# Patient Record
Sex: FEMALE | Race: BLACK OR AFRICAN AMERICAN | NOT HISPANIC OR LATINO | URBAN - METROPOLITAN AREA
[De-identification: names, ages, dates, MRNs, and addresses within clinical notes are randomized per-mention and may not be internally consistent; named-entity substitution may affect disease eponyms.]

---

## 2017-08-04 PROBLEM — Z00.00 ENCOUNTER FOR PREVENTIVE HEALTH EXAMINATION: Status: ACTIVE | Noted: 2017-08-04

## 2017-10-13 VITALS
WEIGHT: 240.08 LBS | RESPIRATION RATE: 16 BRPM | HEART RATE: 76 BPM | DIASTOLIC BLOOD PRESSURE: 80 MMHG | SYSTOLIC BLOOD PRESSURE: 134 MMHG | OXYGEN SATURATION: 97 % | HEIGHT: 67 IN

## 2017-10-13 RX ORDER — ESOMEPRAZOLE MAGNESIUM 40 MG/1
1 CAPSULE, DELAYED RELEASE ORAL
Qty: 0 | Refills: 0 | COMMUNITY

## 2017-10-13 RX ORDER — GABAPENTIN 400 MG/1
0 CAPSULE ORAL
Qty: 0 | Refills: 0 | COMMUNITY

## 2017-10-13 NOTE — H&P ADULT - NSHPLABSRESULTS_GEN_ALL_CORE
ISTAT labs   Hgb 12.9, K+ 3.8, BUN/Cr 9/0.8, PT 13.5, INR 1.1    Dr. Christiano webster to proceed with ISTAT labs ISTAT labs   Hgb 12.9, K+ 3.8, BUN/Cr 9/0.8, PT 13.5, INR 1.1    Dr. Christiano webster to proceed with ISTAT labs    EKG revealed NSR @ 76bpm with incomplete RBBB

## 2017-10-13 NOTE — H&P ADULT - GASTROINTESTINAL DETAILS
no distention/nontender/obese/no rigidity/bowel sounds normal/no guarding/soft/no rebound tenderness

## 2017-10-13 NOTE — H&P ADULT - HISTORY OF PRESENT ILLNESS
LMP: 5yrs ago    52 y.o Female  VERY POOR HISTORIAN with PMHx of GERD, PARISH, insomnia who presented to her cardiologist c/o new onset  CP and SOB  for the past few weeks.   Pt states she has been experiencing intermittent episodes of non radiating sub sternal CP with accompanying dyspnea occurring at rest and worsening upon exertion.  She states she can no longer walk more than one city block before the developing these symptoms and finding the need to stop and rest.  Denies  palpitations, dizziness, syncope, recent PND/orthopnea, LE edema.  Pt states she has had no diagnostic cardiac studies performed.      In light of pt's risk factors and above CCS Anginal Class 4  symptoms,  pt is now referred to [facility] for recommended Cardiac Cath with possible intervention if clinically indicated to  r/o suspected underlying ischemia. LMP: 5yrs ago    52 y.o Female  VERY POOR HISTORIAN with PMHx of GERD, PARISH, insomnia who presented to her cardiologist c/o new onset  CP and SOB  for the past few weeks.   Pt states she has been experiencing intermittent episodes of non radiating sub sternal CP with accompanying dyspnea occurring at rest and worsening upon exertion.  She states she can no longer walk more than 1/2 city block before the developing these symptoms and finding the need to stop and rest  mid  way.   Denies  palpitations, dizziness, syncope, recent PND/orthopnea, LE edema.  Pt states she has had no diagnostic cardiac studies performed.      In light of pt's risk factors and above CCS Anginal Class 4  symptoms,  pt is now referred to [facility] for recommended Cardiac Cath with possible intervention if clinically indicated to  r/o suspected underlying ischemia. LMP: 5yrs ago    52 y.o Female  VERY POOR HISTORIAN with PMHx of GERD, PARISH, insomnia who presented to her cardiologist c/o new onset  CP and SOB  for the past few weeks.   Pt states she has been experiencing intermittent episodes of non radiating sub sternal CP with accompanying dyspnea occurring at rest and worsening upon exertion.  She states she can no longer walk more than 1/2 city block before the developing these symptoms and finding the need to stop and rest  mid  way.   Denies  palpitations, dizziness, syncope, recent PND/orthopnea, LE edema.  Pt states she has had no diagnostic cardiac studies performed. In light of pt's risk factors and above CCS Anginal Class 4  symptoms,  pt is now referred to [facility] for recommended Cardiac Cath with possible intervention if clinically indicated to  r/o suspected underlying ischemia.

## 2017-10-13 NOTE — H&P ADULT - ASSESSMENT
52 y.o obese Female  VERY POOR HISTORIAN with PMHx of GERD, PARISH, insomnia who presents for recommended cardiac catheterization with possible intervention in the setting of class IV anginal symptoms and risk factors.    ASA 2, Mallampati 2    OF NOTE: Patient not on ASA and plavix. Patient to received ASA 325mg and plavix 600mg prior to procedure.    Risks & benefits of procedure and alternative therapy have been explained to the patient including but not limited to: allergic reaction, bleeding w/possible need for blood transfusion, infection, renal and vascular compromise, limb damage, arrhythmia, stroke, vessel dissection/perforation, Myocardial infarction, emergent CABG. Informed consent obtained and in chart. 52 y.o obese Female  VERY POOR HISTORIAN with PMHx of GERD, PARISH, insomnia who presents for recommended cardiac catheterization with possible intervention in the setting of class IV anginal symptoms and risk factors.    ASA 2, Mallampati 2    OF NOTE: Patient not on ASA and plavix. Patient to received ASA 325mg and plavix 600mg prior to procedure. IVF NS @ 75cc/hr pre-cath.    Risks & benefits of procedure and alternative therapy have been explained to the patient including but not limited to: allergic reaction, bleeding w/possible need for blood transfusion, infection, renal and vascular compromise, limb damage, arrhythmia, stroke, vessel dissection/perforation, Myocardial infarction, emergent CABG. Informed consent obtained and in chart.

## 2017-10-16 ENCOUNTER — OUTPATIENT (OUTPATIENT)
Dept: OUTPATIENT SERVICES | Facility: HOSPITAL | Age: 52
LOS: 1 days | Discharge: NURSING, REHAB OR LONG-TERM CARE | End: 2017-10-16
Payer: MEDICARE

## 2017-10-16 DIAGNOSIS — Z98.890 OTHER SPECIFIED POSTPROCEDURAL STATES: Chronic | ICD-10-CM

## 2017-10-16 DIAGNOSIS — Z98.51 TUBAL LIGATION STATUS: Chronic | ICD-10-CM

## 2017-10-16 DIAGNOSIS — Z90.49 ACQUIRED ABSENCE OF OTHER SPECIFIED PARTS OF DIGESTIVE TRACT: Chronic | ICD-10-CM

## 2017-10-16 LAB
ALBUMIN SERPL ELPH-MCNC: 4 G/DL — SIGNIFICANT CHANGE UP (ref 3.3–5)
ALP SERPL-CCNC: 52 U/L — SIGNIFICANT CHANGE UP (ref 40–120)
ALT FLD-CCNC: 21 U/L — SIGNIFICANT CHANGE UP (ref 10–45)
ANION GAP SERPL CALC-SCNC: 13 MMOL/L — SIGNIFICANT CHANGE UP (ref 5–17)
APTT BLD: 28.8 SEC — SIGNIFICANT CHANGE UP (ref 27.5–37.4)
AST SERPL-CCNC: 24 U/L — SIGNIFICANT CHANGE UP (ref 10–40)
BASOPHILS NFR BLD AUTO: 0.1 % — SIGNIFICANT CHANGE UP (ref 0–2)
BILIRUB SERPL-MCNC: 0.6 MG/DL — SIGNIFICANT CHANGE UP (ref 0.2–1.2)
BUN SERPL-MCNC: 9 MG/DL — SIGNIFICANT CHANGE UP (ref 7–23)
CALCIUM SERPL-MCNC: 9.5 MG/DL — SIGNIFICANT CHANGE UP (ref 8.4–10.5)
CHLORIDE SERPL-SCNC: 100 MMOL/L — SIGNIFICANT CHANGE UP (ref 96–108)
CHOLEST SERPL-MCNC: 191 MG/DL — SIGNIFICANT CHANGE UP (ref 10–199)
CK MB CFR SERPL CALC: 2 NG/ML — SIGNIFICANT CHANGE UP (ref 0–6.7)
CO2 SERPL-SCNC: 29 MMOL/L — SIGNIFICANT CHANGE UP (ref 22–31)
CREAT SERPL-MCNC: 0.74 MG/DL — SIGNIFICANT CHANGE UP (ref 0.5–1.3)
CRP SERPL-MCNC: 1.7 MG/DL — HIGH (ref 0–0.4)
EOSINOPHIL NFR BLD AUTO: 1.3 % — SIGNIFICANT CHANGE UP (ref 0–6)
GLUCOSE SERPL-MCNC: 90 MG/DL — SIGNIFICANT CHANGE UP (ref 70–99)
HBA1C BLD-MCNC: 5.2 % — SIGNIFICANT CHANGE UP (ref 4–5.6)
HCG SERPL-ACNC: 1.7 MIU/ML — SIGNIFICANT CHANGE UP
HCT VFR BLD CALC: 37 % — SIGNIFICANT CHANGE UP (ref 34.5–45)
HDLC SERPL-MCNC: 63 MG/DL — SIGNIFICANT CHANGE UP (ref 40–125)
HGB BLD-MCNC: 11.5 G/DL — SIGNIFICANT CHANGE UP (ref 11.5–15.5)
INR BLD: 1.03 — SIGNIFICANT CHANGE UP (ref 0.88–1.16)
LIPID PNL WITH DIRECT LDL SERPL: 114 MG/DL — SIGNIFICANT CHANGE UP
LYMPHOCYTES # BLD AUTO: 48.4 % — HIGH (ref 13–44)
MCHC RBC-ENTMCNC: 27.8 PG — SIGNIFICANT CHANGE UP (ref 27–34)
MCHC RBC-ENTMCNC: 31.1 G/DL — LOW (ref 32–36)
MCV RBC AUTO: 89.6 FL — SIGNIFICANT CHANGE UP (ref 80–100)
MONOCYTES NFR BLD AUTO: 5.9 % — SIGNIFICANT CHANGE UP (ref 2–14)
NEUTROPHILS NFR BLD AUTO: 44.3 % — SIGNIFICANT CHANGE UP (ref 43–77)
PLATELET # BLD AUTO: 270 K/UL — SIGNIFICANT CHANGE UP (ref 150–400)
POTASSIUM SERPL-MCNC: 3.9 MMOL/L — SIGNIFICANT CHANGE UP (ref 3.5–5.3)
POTASSIUM SERPL-SCNC: 3.9 MMOL/L — SIGNIFICANT CHANGE UP (ref 3.5–5.3)
PROT SERPL-MCNC: 7.6 G/DL — SIGNIFICANT CHANGE UP (ref 6–8.3)
PROTHROM AB SERPL-ACNC: 11.4 SEC — SIGNIFICANT CHANGE UP (ref 9.8–12.7)
RBC # BLD: 4.13 M/UL — SIGNIFICANT CHANGE UP (ref 3.8–5.2)
RBC # FLD: 13.9 % — SIGNIFICANT CHANGE UP (ref 10.3–16.9)
SODIUM SERPL-SCNC: 142 MMOL/L — SIGNIFICANT CHANGE UP (ref 135–145)
TOTAL CHOLESTEROL/HDL RATIO MEASUREMENT: 3 RATIO — LOW (ref 3.3–7.1)
TRIGL SERPL-MCNC: 69 MG/DL — SIGNIFICANT CHANGE UP (ref 10–149)
WBC # BLD: 7.2 K/UL — SIGNIFICANT CHANGE UP (ref 3.8–10.5)
WBC # FLD AUTO: 7.2 K/UL — SIGNIFICANT CHANGE UP (ref 3.8–10.5)

## 2017-10-16 PROCEDURE — 85025 COMPLETE CBC W/AUTO DIFF WBC: CPT

## 2017-10-16 PROCEDURE — 84702 CHORIONIC GONADOTROPIN TEST: CPT

## 2017-10-16 PROCEDURE — 93010 ELECTROCARDIOGRAM REPORT: CPT

## 2017-10-16 PROCEDURE — 80053 COMPREHEN METABOLIC PANEL: CPT

## 2017-10-16 PROCEDURE — C1887: CPT

## 2017-10-16 PROCEDURE — 85730 THROMBOPLASTIN TIME PARTIAL: CPT

## 2017-10-16 PROCEDURE — 82553 CREATINE MB FRACTION: CPT

## 2017-10-16 PROCEDURE — C1769: CPT

## 2017-10-16 PROCEDURE — 93458 L HRT ARTERY/VENTRICLE ANGIO: CPT | Mod: 26

## 2017-10-16 PROCEDURE — 85610 PROTHROMBIN TIME: CPT

## 2017-10-16 PROCEDURE — 93458 L HRT ARTERY/VENTRICLE ANGIO: CPT

## 2017-10-16 PROCEDURE — 93005 ELECTROCARDIOGRAM TRACING: CPT

## 2017-10-16 PROCEDURE — 86140 C-REACTIVE PROTEIN: CPT

## 2017-10-16 PROCEDURE — 83036 HEMOGLOBIN GLYCOSYLATED A1C: CPT

## 2017-10-16 PROCEDURE — 80061 LIPID PANEL: CPT

## 2017-10-16 PROCEDURE — 82550 ASSAY OF CK (CPK): CPT

## 2017-10-16 RX ORDER — SODIUM CHLORIDE 9 MG/ML
1000 INJECTION INTRAMUSCULAR; INTRAVENOUS; SUBCUTANEOUS
Qty: 0 | Refills: 0 | Status: DISCONTINUED | OUTPATIENT
Start: 2017-10-16 | End: 2017-10-16

## 2017-10-16 RX ORDER — SODIUM CHLORIDE 9 MG/ML
500 INJECTION INTRAMUSCULAR; INTRAVENOUS; SUBCUTANEOUS
Qty: 0 | Refills: 0 | Status: DISCONTINUED | OUTPATIENT
Start: 2017-10-16 | End: 2017-10-16

## 2017-10-16 RX ORDER — ASPIRIN/CALCIUM CARB/MAGNESIUM 324 MG
325 TABLET ORAL ONCE
Qty: 0 | Refills: 0 | Status: COMPLETED | OUTPATIENT
Start: 2017-10-16 | End: 2017-10-16

## 2017-10-16 RX ORDER — CLOPIDOGREL BISULFATE 75 MG/1
600 TABLET, FILM COATED ORAL ONCE
Qty: 0 | Refills: 0 | Status: COMPLETED | OUTPATIENT
Start: 2017-10-16 | End: 2017-10-16

## 2017-10-16 RX ORDER — CHLORHEXIDINE GLUCONATE 213 G/1000ML
1 SOLUTION TOPICAL ONCE
Qty: 0 | Refills: 0 | Status: DISCONTINUED | OUTPATIENT
Start: 2017-10-16 | End: 2017-10-16

## 2017-10-16 RX ADMIN — CLOPIDOGREL BISULFATE 600 MILLIGRAM(S): 75 TABLET, FILM COATED ORAL at 10:02

## 2017-10-16 RX ADMIN — Medication 325 MILLIGRAM(S): at 10:02

## 2017-10-16 RX ADMIN — SODIUM CHLORIDE 75 MILLILITER(S): 9 INJECTION INTRAMUSCULAR; INTRAVENOUS; SUBCUTANEOUS at 10:02

## 2017-10-16 NOTE — PROGRESS NOTE ADULT - SUBJECTIVE AND OBJECTIVE BOX
Interventional Cardiology PA SDA Discharge Note    Patient without complaints. Ambulated and voided without difficulties    Afebrile, VSS    Ext:    	Right Radial : no  hematoma, no bleeding, dressing; C/D/I      Pulses:    intact RAD 2+     A/P:  52 y.o obese Female  with PMHx of GERD, PARISH, insomnia who presents for recommended cardiac catheterization with possible intervention in the setting of class IV anginal symptoms and risk factors.          1.	Stable for discharge as per attending Dr. Alvarado after bed rest, radial badn removal if wrist stable and 30 minutes of ambulation.  2.	Follow-up with PMD/Cardiologist _[facility]  in 1-2 weeks  3.	Discharged forms signed and copies in chart Interventional Cardiology PA SDA Discharge Note    Patient without complaints. Ambulated and voided without difficulties    Afebrile, VSS    Ext:    	Right Radial : no  hematoma, no bleeding, dressing; C/D/I      Pulses:    intact RAD 2+     A/P:  52 y.o obese Female  with PMHx of GERD, PARISH, insomnia who presents for recommended cardiac catheterization with possible intervention in the setting of class IV anginal symptoms and risk factors.  CARDIAC CATH 10/16: normal coronaries, EF 60%, EDP 17, no valvular disease (R radial access)    1.	Stable for discharge as per attending Dr. Alvarado after bed rest, radial badn removal if wrist stable and 30 minutes of ambulation.  2.	Follow-up with PMD/Cardiologist _[facility]  in 1-2 weeks  3.	Discharged forms signed and copies in chart

## 2017-10-19 DIAGNOSIS — I25.10 ATHEROSCLEROTIC HEART DISEASE OF NATIVE CORONARY ARTERY WITHOUT ANGINA PECTORIS: ICD-10-CM

## 2023-05-04 PROBLEM — G47.33 OBSTRUCTIVE SLEEP APNEA (ADULT) (PEDIATRIC): Chronic | Status: ACTIVE | Noted: 2017-10-16

## 2023-05-04 PROBLEM — K21.9 GASTRO-ESOPHAGEAL REFLUX DISEASE WITHOUT ESOPHAGITIS: Chronic | Status: ACTIVE | Noted: 2017-10-16
